# Patient Record
Sex: FEMALE | Race: WHITE
[De-identification: names, ages, dates, MRNs, and addresses within clinical notes are randomized per-mention and may not be internally consistent; named-entity substitution may affect disease eponyms.]

---

## 2018-11-02 ENCOUNTER — HOSPITAL ENCOUNTER (OUTPATIENT)
Dept: HOSPITAL 80 - FIMAGING | Age: 48
End: 2018-11-02
Attending: ORTHOPAEDIC SURGERY
Payer: COMMERCIAL

## 2018-11-02 DIAGNOSIS — M17.12: ICD-10-CM

## 2018-11-02 DIAGNOSIS — Z01.818: Primary | ICD-10-CM

## 2018-11-14 ENCOUNTER — HOSPITAL ENCOUNTER (OUTPATIENT)
Dept: HOSPITAL 80 - F3N | Age: 48
Setting detail: OBSERVATION
LOS: 1 days | Discharge: HOME | End: 2018-11-15
Attending: ORTHOPAEDIC SURGERY | Admitting: ORTHOPAEDIC SURGERY
Payer: COMMERCIAL

## 2018-11-14 DIAGNOSIS — G47.33: ICD-10-CM

## 2018-11-14 DIAGNOSIS — M17.12: Primary | ICD-10-CM

## 2018-11-14 DIAGNOSIS — E66.9: ICD-10-CM

## 2018-11-14 PROCEDURE — 27447 TOTAL KNEE ARTHROPLASTY: CPT

## 2018-11-14 PROCEDURE — G0378 HOSPITAL OBSERVATION PER HR: HCPCS

## 2018-11-14 PROCEDURE — 97116 GAIT TRAINING THERAPY: CPT

## 2018-11-14 PROCEDURE — 97110 THERAPEUTIC EXERCISES: CPT

## 2018-11-14 PROCEDURE — 73560 X-RAY EXAM OF KNEE 1 OR 2: CPT

## 2018-11-14 PROCEDURE — 20985 CPTR-ASST DIR MS PX: CPT

## 2018-11-14 PROCEDURE — 97530 THERAPEUTIC ACTIVITIES: CPT

## 2018-11-14 PROCEDURE — 97161 PT EVAL LOW COMPLEX 20 MIN: CPT

## 2018-11-14 RX ADMIN — ACETAMINOPHEN SCH MG: 325 TABLET ORAL at 12:04

## 2018-11-14 RX ADMIN — ASPIRIN 81 MG SCH MG: 81 TABLET ORAL at 21:17

## 2018-11-14 RX ADMIN — DOCUSATE SODIUM AND SENNOSIDES SCH: 50; 8.6 TABLET ORAL at 10:22

## 2018-11-14 RX ADMIN — DOCUSATE SODIUM AND SENNOSIDES SCH TAB: 50; 8.6 TABLET ORAL at 21:17

## 2018-11-14 RX ADMIN — FAMOTIDINE SCH MG: 20 TABLET, FILM COATED ORAL at 21:17

## 2018-11-14 RX ADMIN — OXYCODONE HYDROCHLORIDE PRN MG: 15 TABLET ORAL at 10:07

## 2018-11-14 RX ADMIN — OXYCODONE HYDROCHLORIDE PRN MG: 15 TABLET ORAL at 15:01

## 2018-11-14 RX ADMIN — ACETAMINOPHEN SCH MG: 325 TABLET ORAL at 18:02

## 2018-11-14 RX ADMIN — OXYCODONE HYDROCHLORIDE PRN MG: 15 TABLET ORAL at 21:15

## 2018-11-14 RX ADMIN — Medication SCH MLS: at 21:17

## 2018-11-14 RX ADMIN — ACETAMINOPHEN SCH MG: 325 TABLET ORAL at 23:52

## 2018-11-14 RX ADMIN — Medication SCH MLS: at 14:56

## 2018-11-14 NOTE — POSTOPPROG
Post Op Note


Date of Operation: 11/14/18


Surgeon: CESAR Barnes


Assistant: phoenix barnes PA-C


Anesthesiologist: dr. durbin


Anesthesia: Spinal, Other (Specify) (adductor canal block)


Pre-op Diagnosis: L knee OA


Post-op Diagnosis: same


Indication: left knee pain


Procedure:  L TKA robot assisted


Findings: severe knee OA


Inf/Abcess present in the surg proc area at time of surgery?: No


EBL:

## 2018-11-14 NOTE — PDANEPAE
ANE Past Medical History





- Cardiovascular History


Hx Hypertension: No


Hx Arrhythmias: No


Hx Chest Pain: No


Hx Coronary Artery / Peripheral Vascular Disease: No


Hx CHF / Valvular Disease: No


Hx Palpitations: No





- Pulmonary History


Hx COPD: No


Hx Asthma/Reactive Airway Disease: No


Hx Recent Upper Respiratory Infection: No


Hx Oxygen in Use at Home: No


Hx Sleep Apnea: Yes


Sleep Apnea Screening Result - Last Documented: Positive


Pulmonary History Comment: sb positive- no devices currently





- Neurologic History


Hx Cerebrovascular Accident: No


Hx Seizures: No


Hx Dementia: No





- Endocrine History


Hx Diabetes: No


Hypothyroid: No


Hyperthyroid: No


Obesity: yes, moderate





- Renal History


Hx Renal Disorders: No





- Liver History


Hx Hepatic Disorders: No





- Neurological & Psychiatric Hx


Hx Neurological and Psychiatric Disorders: No





- Cancer History


Hx Cancer: No





- Congenital Disorder History


Hx Congenital Disorders: No





- GI History


GERD: no


Hx Gastrointestinal Disorders: No





- Other Health History


Other Health History: wears glasses or contacts.  dry skin.  seasonal allergies





- Chronic Pain History


Chronic Pain: Yes





- Surgical History


Prior Surgeries: 06/2018 cartiva implant on right big toe.  left knee 1997 acl 

replacement.  2007 torn acl





ANE Review of Systems


Review of Systems: 








- Exercise capacity


Exercise capacity: <4 METS


METS (RN): 4 METS





ANE Patient History





- Allergies


Allergies/Adverse Reactions: 








No Known Allergies Allergy (Verified 10/29/18 12:26)


 








- Home Medications


Home Medications: 








DIAZEPAM  10/29/18 [Last Taken Unknown]


Flonase Allergy Relief  10/29/18 [Last Taken Unknown]


Ibuprofen  10/29/18 [Last Taken Unknown]


Meloxicam  10/29/18 [Last Taken Unknown]








- Anes Hx


Anes Hx: no prior problems





- Smoking Hx


Smoking Status: Never smoked





- Alcohol Use


Alcohol Use: Rarely





- Family Anes Hx


Family Anes Hx: neg - N/A


Family Hx Anesthesia Complications: none





ANE Labs/Vital Signs





- Vital Signs


Height: 160.02 cm


Weight: 90.718 kg





ANE Physical Exam





- Airway


Neck exam: FROM


Mallampati Score: Class 2


Mouth exam: normal dental/mouth exam





- Pulmonary


Pulmonary: no respiratory distress, no rales or rhonchi, clear to auscultation





- Cardiovascular


Cardiovascular: regular rate and rhythym, no murmur, rub, or gallop





- ASA Status


ASA Status: II





ANE Anesthesia Plan


Anesthesia Plan: MAC, spinal


Total IV Anesthesia: No

## 2018-11-15 VITALS — SYSTOLIC BLOOD PRESSURE: 128 MMHG | DIASTOLIC BLOOD PRESSURE: 92 MMHG

## 2018-11-15 RX ADMIN — ACETAMINOPHEN SCH MG: 325 TABLET ORAL at 05:28

## 2018-11-15 RX ADMIN — OXYCODONE HYDROCHLORIDE PRN MG: 15 TABLET ORAL at 12:11

## 2018-11-15 RX ADMIN — FAMOTIDINE SCH MG: 20 TABLET, FILM COATED ORAL at 08:03

## 2018-11-15 RX ADMIN — ACETAMINOPHEN SCH MG: 325 TABLET ORAL at 12:11

## 2018-11-15 RX ADMIN — OXYCODONE HYDROCHLORIDE PRN MG: 15 TABLET ORAL at 08:04

## 2018-11-15 RX ADMIN — DOCUSATE SODIUM AND SENNOSIDES SCH TAB: 50; 8.6 TABLET ORAL at 08:03

## 2018-11-15 RX ADMIN — ASPIRIN 81 MG SCH MG: 81 TABLET ORAL at 08:03

## 2018-11-15 NOTE — GOP
DATE OF OPERATION:  11/14/2018



SURGEON:  MILADYS Heath MD



ASSISTANT:  FLORENCE Joyce.



ANESTHESIA:  Spinal.



PREOPERATIVE DIAGNOSIS:  Left knee osteoarthritis.



POSTOPERATIVE DIAGNOSIS:  Left knee osteoarthritis.



PROCEDURE PERFORMED:  Left total knee arthroplasty with computer navigation, 
robotic assist.



FINDINGS:  





ESTIMATED BLOOD LOSS:  30 cc.



INDICATIONS:  The patient is a 48-year-old female with severe and progressive 
pain and deformity of the left knee unresponsive to conservative care.  The 
risks and benefits of surgical intervention were explained in detail.



DESCRIPTION OF PROCEDURE:  The patient was brought to the operative room and 
placed on the table in the supine position. Spinal anesthesia was induced 
without difficulty. A pneumatic tourniquet was applied about the left proximal 
thigh, and the leg was prepped and draped in a sterile fashion. The leg campos 
was applied. After exsanguination by elevation the tourniquet was inflated to 
250 mmHg. 



Incision was made anterior medial from the tibial tuberosity to a point 2 cm 
proximal to the superior pole of the patella. Medial parapatellar arthrotomy 
was carried out from the superior pole of the patella and posteriorly in line 
with the fibers of the Type II VMO. The medial collateral ligament was elevated 
and the infrapatellar fat pad was resected. 



The patella was everted and the articular surface was excised. A 32 mm patellar 
button was placed.  

Attention was turned first to the distal aspect of the femur.  After exposure 
of the femur, 2 half pins were placed for fixation of the femoral array.  In a 
similar fashion, 2 pins were placed anteromedial on the tibia for fixation of 
the tibial array.  External land marking and registration of the hip center was 
performed without difficulty.  Internal femoral and tibial registration was 
carried out without difficulty and the femoral and tibial checkpoints were 
placed and verified for accuracy. 



Attention was turned to the femur.  The foot print for the size 2 femoral 
component was cut with the saw using the Kar[employer] Snap robotic system and verified for 
accuracy against the CT based plan.  In a similar fashion, the saw was used to 
cut the footprint for the size 3 tibial component using the TOY system and 
verified for accuracy against the CT based plan. The tibial articular surface 
was excised without difficulty. 



The knee was extended and the remnants of the medial and lateral meniscus were 
excised. The posterior capsule was injected with ropivacaine, epinephrine and 
Toradol.  A size 3 tibial tray was positioned. Trial reduction was then carried 
out. There was excellent range of motion, alignment, and stability using the 3 
x 11 mm polyethylene. 



All trials were then removed. The joint was thoroughly irrigated and carefully 
dried. The press-fit components were implanted. The permanent 3 x 11 mm 
polyethylene was placed without difficulty. 



The tourniquet was deflated and all bleeders were coagulated. The wound was 
thoroughly irrigated and closed using interrupted sutures of 2-0 Vicryl for the 
joint capsule. The subcu was closed with 3-0 Vicryl and the skin with 4-0 
Monocryl.  Dermabond and Steri-Strips were applied followed by a compressive 
dressing. The patient was then moved from the operating room to the recovery 
room in good condition, having tolerated the procedure well.



PATHOLOGY:  Severe medial patellofemoral osteoarthritis.





Job #:  [id number]

MTDD

## 2018-11-15 NOTE — SOAPPROG
SOAP Progress Note


Assessment/Plan: 


Assessment:





Patient is doing well POD 1 s/p L TKA


Pain management: pain is well controlled on oral pain meds.


VTE ppx: recommend aspirin 81 mg BID for 4 weeks, cont ALYSHA and SCDs


Anemia: level is expected initially postop. Asymptomatic. Continue to monitor 


D/c planning: d/c to home today pending release from PT 




















Plan:





11/15/18 17:09





Subjective: 





patient is doing well, denies SOB ,chest pain and n/V


Objective: 





 Vital Signs











Temp Pulse Resp BP Pulse Ox


 


 36.6 C   71   15   128/92 H  96 


 


 11/15/18 07:33  11/15/18 07:33  11/15/18 07:33  11/15/18 07:33  11/15/18 07:33








 Laboratory Results





 11/15/18 05:16 





 











 11/14/18 11/15/18 11/16/18





 05:59 05:59 05:59


 


Intake Total  3075 


 


Output Total  2425 


 


Balance  650 








LLE; incision dressing is clean and dry, NVI, +pf/df





ICD10 Worksheet


Patient Problems: 


 Problems











Problem Status Onset


 


Primary localized osteoarthritis of left knee Acute

## 2018-11-16 NOTE — GDS
ADMISSION DIAGNOSIS:  Left knee osteoarthritis.



DISCHARGE DIAGNOSIS:  Left knee osteoarthritis.



PROCEDURE:  Left total knee arthroplasty, robotic assisted.



VTE PROPHYLAXIS:  Recommend aspirin 81 mg for 4 weeks.



BRIEF DESCRIPTION OF HOSPITAL STAY:  Patient was admitted for an elective joint arthroplasty.  The pa
ramesh tolerated the procedure well and has passed physical therapy.  The patient was given appropriat
e antibiotic prophylaxis and venous thromboembolism prophylaxis.  The patient's pain was well control
led on oral pain medication, patient was holding down food, and had urinated.  Decision was made to d
ischarge the patient.  The patient was given post-operative prescriptions pre-operatively.



PLAN:  To follow up as scheduled with Dr. Heath's office December 6th at 11:15.





Job #:  [id number]